# Patient Record
Sex: OTHER/UNKNOWN | Race: WHITE | NOT HISPANIC OR LATINO | Employment: FULL TIME | URBAN - METROPOLITAN AREA
[De-identification: names, ages, dates, MRNs, and addresses within clinical notes are randomized per-mention and may not be internally consistent; named-entity substitution may affect disease eponyms.]

---

## 2024-01-08 ENCOUNTER — TELEMEDICINE (OUTPATIENT)
Dept: UROLOGY | Facility: CLINIC | Age: 24
End: 2024-01-08
Payer: COMMERCIAL

## 2024-01-08 DIAGNOSIS — F64.9 GENDER DYSPHORIA: Primary | ICD-10-CM

## 2024-01-08 PROCEDURE — 99205 OFFICE O/P NEW HI 60 MIN: CPT | Performed by: UROLOGY

## 2024-01-08 NOTE — PROGRESS NOTES
"GENDER CARE INITIAL EVALUATION    PROBLEM LIST:  1. No diagnosis found.     HISTORY OF PRESENT ILLNESS:  Marv Cardoso is a 23 y.o. trans man who is seen for discussion of gender-affirming phalloplasty.  Pronouns are he/him.  Born of [place], raised in [place] >back to [place]. Now   Social transition 2 years ago  T nov 2023  Top surgery April 2023    Partnered with boyfriend  LEFT handed  Goals: looks like a cis penis. Standoing to void, and sensation- all pretty high on priority  WANTS TO AVOID ARM SCAR    5'4\"  125 lbs      Interested in fertility preservation:   Goals of surgery: orthostatic urination    Mental health issues: Depression Anxiety prev suicide ideation. None since starting T  Substance use:  none  Medical issues:     Hair removal: No     PAST MEDICAL HISTORY:  No past medical history on file.    PAST SURGICAL HISTORY:  No past surgical history on file.     ALLERGIES:   Not on File     MEDICATIONS:   [unfilled]     SOCIAL HISTORY:  Patient     Social History     Socioeconomic History    Marital status: Single     Spouse name: Not on file    Number of children: Not on file    Years of education: Not on file    Highest education level: Not on file   Occupational History    Not on file   Tobacco Use    Smoking status: Not on file    Smokeless tobacco: Not on file   Substance and Sexual Activity    Alcohol use: Not on file    Drug use: Not on file    Sexual activity: Not on file   Other Topics Concern    Not on file   Social History Narrative    Not on file     Social Determinants of Health     Financial Resource Strain: Not on file   Food Insecurity: Not on file   Transportation Needs: Not on file   Physical Activity: Not on file   Stress: Not on file   Social Connections: Not on file   Intimate Partner Violence: Not on file   Housing Stability: Not on file       FAMILY HISTORY:  No family history on file.    REVIEW OF SYSTEMS:  Review of Systems    PHYSICAL EXAM    LABORATORY REVIEW:   [unfilled]    No results " "found for: \"GLU\", \"BUN\", \"CREATININE\", \"EGFR\", \"NA\", \"K\", \"CL\", \"CO2\", \"OSMOLALITY\", \"CALCIUM\"   No results found for: \"WBC\", \"RBC\", \"HGB\", \"HCT\", \"MCV\", \"MCH\", \"MCHC\", \"RDW\", \"PLT\", \"MPV\"        Assessment:    No diagnosis found.    Marv Cardoso is a 23 y.o. TGD: trans man interested in gender-affirming ALT phalloplasty with urethral lengthening.  Social support: Yes   Smoker: No   Diabetic: No No results found for: \"HGBA1C\"  There is no height or weight on file to calculate BMI.       Plan:   Extensive discussion of masculinzing surgical options as below  Overall, it appears that an ALT single TUBE phalloplasty followed by the artery urethroplasty technique would be the most suited for him.  This would entail 3 operations 6 months apart followed by a fourth operation if he desires a penile implant. I told him we would have to see him in person to assess his thighs.  We also have to get a CT angiogram prior to proceeding with any surgery.  Information on getting letters etc. were given.  He will think about options and give us a call.  Should he proceed with us, neck steps would be an in person exam as well as a CT angiogram of bilateral lower extremities  Need letters of support from mental health providers, information provided        We discussed the [facility] crietria for bottom surgery  Discussed the various forms of masculinizing bottom surgery  These include metoidioplasty and phalloplasty (radial forearm flap, abdominal, and anterolateral thigh)  Phalloplasty  Surgery is completed in multiple stages at intervals of at least 6 months  The first stage involves placement of a flap in the groin  The second stage involves urethral join-up and glans formation  The third stage involves placement of a penile and testicular prostheses    Discussed potential complications associated with phalloplasty  These include flap necrosis, urethral stricture, fistula, diverticulum, and need for multiple surgeries and repairs    We " discussed the need for permanent hair removal, and briefly discussed laser versus electrolysis